# Patient Record
Sex: MALE | Race: WHITE
[De-identification: names, ages, dates, MRNs, and addresses within clinical notes are randomized per-mention and may not be internally consistent; named-entity substitution may affect disease eponyms.]

---

## 2017-08-18 ENCOUNTER — HOSPITAL ENCOUNTER (OUTPATIENT)
Dept: HOSPITAL 22 - LAB | Age: 67
End: 2017-08-18
Attending: INTERNAL MEDICINE
Payer: MEDICARE

## 2017-08-18 DIAGNOSIS — Q60.0: Primary | ICD-10-CM

## 2017-08-18 DIAGNOSIS — I48.0: ICD-10-CM

## 2017-08-18 DIAGNOSIS — I10: ICD-10-CM

## 2017-08-18 DIAGNOSIS — Z95.0: ICD-10-CM

## 2017-08-18 LAB
BUN: 20 MG/DL (ref 7–18)
GFR SERPLBLD BASED ON 1.73 SQ M-ARVRAT: 61 ML/MIN (ref 60–?)

## 2021-04-15 ENCOUNTER — HOSPITAL ENCOUNTER (OUTPATIENT)
Age: 71
End: 2021-04-15
Payer: MEDICARE

## 2021-04-15 DIAGNOSIS — I11.9: ICD-10-CM

## 2021-04-15 DIAGNOSIS — I48.0: ICD-10-CM

## 2021-04-15 DIAGNOSIS — R94.31: ICD-10-CM

## 2021-04-15 DIAGNOSIS — I27.20: ICD-10-CM

## 2021-04-15 DIAGNOSIS — Z95.0: ICD-10-CM

## 2021-04-15 PROCEDURE — 93306 TTE W/DOPPLER COMPLETE: CPT

## 2021-04-19 ENCOUNTER — HOSPITAL ENCOUNTER (OUTPATIENT)
Age: 71
End: 2021-04-19
Payer: MEDICARE

## 2021-04-19 DIAGNOSIS — E11.9: ICD-10-CM

## 2021-04-19 DIAGNOSIS — I11.0: ICD-10-CM

## 2021-04-19 DIAGNOSIS — I50.9: Primary | ICD-10-CM

## 2021-04-19 LAB
ALBUMIN LEVEL: 3.7 G/DL (ref 3.5–5)
ALP ISO SERPL-ACNC: 84 U/L (ref 38–126)
ALT SERPLBLD-CCNC: 26 U/L (ref 12–78)
ANION GAP SERPL CALC-SCNC: 12.9 MEQ/L (ref 5–15)
AST SERPL QL: 38 U/L (ref 17–59)
BILIRUB DIRECT SERPL-MCNC: 0 MG/DL (ref 0–0.4)
BILIRUB INDIRECT SERPL-MCNC: 0.9 MG/DL (ref 0–0.9)
BILIRUB INDIRECT SERPL-MCNC: 0.9 MG/DL (ref 0–1.1)
BILIRUBIN,TOTAL: 0.9 MG/DL (ref 0.2–1.3)
BUN SERPL-MCNC: 20 MG/DL (ref 9–20)
CALCIUM SPEC-MCNC: 8.7 MG/DL (ref 8.4–10.2)
CHLORIDE SPEC-SCNC: 105 MMOL/L (ref 98–107)
CO2 SERPL-SCNC: 24 MMOL/L (ref 22–30)
CREAT BLD-SCNC: 1.4 MG/DL (ref 0.66–1.25)
ESTIMATED GLOMERULAR FILT RATE: 50 ML/MIN (ref 60–?)
GFR (AFRICAN AMERICAN): 61 ML/MIN (ref 60–?)
GLUCOSE: 158 MG/DL (ref 74–100)
HCT VFR BLD CALC: 37.3 % (ref 42–52)
HGB BLD-MCNC: 13.3 G/DL (ref 14.1–18)
MCHC RBC-ENTMCNC: 35.7 G/DL (ref 31.8–35.4)
MCV RBC: 91.9 FL (ref 80–94)
MEAN CORPUSCULAR HEMOGLOBIN: 32.8 PG (ref 27–31.2)
NT PRO BRAIN NATRIURETIC PEP.: 547 PG/ML (ref 0–125)
PLATELET # BLD: 189 K/MM3 (ref 142–424)
POTASSIUM: 3.9 MMOL/L (ref 3.5–5.1)
PROT SERPL-MCNC: 6.2 G/DL (ref 6.3–8.2)
RBC # BLD AUTO: 4.06 M/MM3 (ref 4.6–6.2)
SODIUM SPEC-SCNC: 138 MMOL/L (ref 136–145)
WBC # BLD AUTO: 6.9 K/MM3 (ref 4.8–10.8)

## 2021-04-19 PROCEDURE — 83880 ASSAY OF NATRIURETIC PEPTIDE: CPT

## 2021-04-19 PROCEDURE — 36415 COLL VENOUS BLD VENIPUNCTURE: CPT

## 2021-04-19 PROCEDURE — 85025 COMPLETE CBC W/AUTO DIFF WBC: CPT

## 2021-04-19 PROCEDURE — 80048 BASIC METABOLIC PNL TOTAL CA: CPT

## 2021-04-19 PROCEDURE — 80076 HEPATIC FUNCTION PANEL: CPT

## 2021-05-20 ENCOUNTER — HOSPITAL ENCOUNTER (OUTPATIENT)
Age: 71
End: 2021-05-20
Payer: MEDICARE

## 2021-05-20 DIAGNOSIS — I10: ICD-10-CM

## 2021-05-20 DIAGNOSIS — I48.0: ICD-10-CM

## 2021-05-20 DIAGNOSIS — Z95.0: ICD-10-CM

## 2021-05-20 DIAGNOSIS — R60.9: ICD-10-CM

## 2021-05-20 DIAGNOSIS — R94.31: ICD-10-CM

## 2021-05-20 DIAGNOSIS — I27.20: ICD-10-CM

## 2021-05-20 DIAGNOSIS — R06.00: ICD-10-CM

## 2021-05-20 LAB
ANION GAP SERPL CALC-SCNC: 11.4 MEQ/L (ref 5–15)
BUN SERPL-MCNC: 27 MG/DL (ref 9–20)
CALCIUM SPEC-MCNC: 8.8 MG/DL (ref 8.4–10.2)
CHLORIDE SPEC-SCNC: 104 MMOL/L (ref 98–107)
CO2 SERPL-SCNC: 28 MMOL/L (ref 22–30)
CREAT BLD-SCNC: 1.3 MG/DL (ref 0.66–1.25)
ESTIMATED GLOMERULAR FILT RATE: 55 ML/MIN (ref 60–?)
GFR (AFRICAN AMERICAN): 66 ML/MIN (ref 60–?)
GLUCOSE: 141 MG/DL (ref 74–100)
POTASSIUM: 4.4 MMOL/L (ref 3.5–5.1)
SODIUM SPEC-SCNC: 139 MMOL/L (ref 136–145)

## 2021-05-20 PROCEDURE — 80048 BASIC METABOLIC PNL TOTAL CA: CPT

## 2021-05-20 PROCEDURE — 93976 VASCULAR STUDY: CPT

## 2021-05-20 PROCEDURE — 36415 COLL VENOUS BLD VENIPUNCTURE: CPT

## 2021-05-24 ENCOUNTER — HOSPITAL ENCOUNTER (OUTPATIENT)
Age: 71
End: 2021-05-24
Payer: MEDICARE

## 2021-05-24 DIAGNOSIS — E78.5: ICD-10-CM

## 2021-05-24 DIAGNOSIS — Z95.0: ICD-10-CM

## 2021-05-24 DIAGNOSIS — I10: ICD-10-CM

## 2021-05-24 DIAGNOSIS — R73.01: Primary | ICD-10-CM

## 2021-05-24 LAB
ANION GAP SERPL CALC-SCNC: 14.6 MEQ/L (ref 5–15)
BUN SERPL-MCNC: 45 MG/DL (ref 9–20)
CALCIUM SPEC-MCNC: 9.2 MG/DL (ref 8.4–10.2)
CHLORIDE SPEC-SCNC: 105 MMOL/L (ref 98–107)
CO2 SERPL-SCNC: 23 MMOL/L (ref 22–30)
CREAT BLD-SCNC: 2.1 MG/DL (ref 0.66–1.25)
ESTIMATED GLOMERULAR FILT RATE: 31 ML/MIN (ref 60–?)
GFR (AFRICAN AMERICAN): 38 ML/MIN (ref 60–?)
GLUCOSE: 144 MG/DL (ref 74–100)
HBA1C MFR BLD: 5.7 % (ref 4–6)
POTASSIUM: 4.6 MMOL/L (ref 3.5–5.1)
SODIUM SPEC-SCNC: 138 MMOL/L (ref 136–145)
URATE SERPL-SCNC: 8.8 MG/DL (ref 3.5–8.5)

## 2021-05-24 PROCEDURE — 83036 HEMOGLOBIN GLYCOSYLATED A1C: CPT

## 2021-05-24 PROCEDURE — 80048 BASIC METABOLIC PNL TOTAL CA: CPT

## 2021-05-24 PROCEDURE — 84550 ASSAY OF BLOOD/URIC ACID: CPT

## 2021-06-01 ENCOUNTER — HOSPITAL ENCOUNTER (OUTPATIENT)
Age: 71
End: 2021-06-01
Payer: MEDICARE

## 2021-06-01 DIAGNOSIS — R94.31: ICD-10-CM

## 2021-06-01 DIAGNOSIS — Z90.5: ICD-10-CM

## 2021-06-01 DIAGNOSIS — I27.20: ICD-10-CM

## 2021-06-01 DIAGNOSIS — Z95.0: ICD-10-CM

## 2021-06-01 DIAGNOSIS — R06.00: ICD-10-CM

## 2021-06-01 DIAGNOSIS — I48.0: ICD-10-CM

## 2021-06-01 DIAGNOSIS — I11.9: Primary | ICD-10-CM

## 2021-06-01 LAB
ANION GAP SERPL CALC-SCNC: 10.1 MEQ/L (ref 5–15)
BUN SERPL-MCNC: 33 MG/DL (ref 9–20)
CALCIUM SPEC-MCNC: 8.5 MG/DL (ref 8.4–10.2)
CHLORIDE SPEC-SCNC: 105 MMOL/L (ref 98–107)
CO2 SERPL-SCNC: 26 MMOL/L (ref 22–30)
CREAT BLD-SCNC: 1.6 MG/DL (ref 0.66–1.25)
ESTIMATED GLOMERULAR FILT RATE: 43 ML/MIN (ref 60–?)
GFR (AFRICAN AMERICAN): 52 ML/MIN (ref 60–?)
GLUCOSE: 117 MG/DL (ref 74–100)
POTASSIUM: 5.1 MMOL/L (ref 3.5–5.1)
SODIUM SPEC-SCNC: 136 MMOL/L (ref 136–145)

## 2021-06-01 PROCEDURE — 80048 BASIC METABOLIC PNL TOTAL CA: CPT

## 2021-06-01 PROCEDURE — 36415 COLL VENOUS BLD VENIPUNCTURE: CPT

## 2021-06-14 ENCOUNTER — HOSPITAL ENCOUNTER (OUTPATIENT)
Age: 71
End: 2021-06-14
Payer: MEDICARE

## 2021-06-14 DIAGNOSIS — R06.00: ICD-10-CM

## 2021-06-14 DIAGNOSIS — I27.20: ICD-10-CM

## 2021-06-14 DIAGNOSIS — Z90.5: ICD-10-CM

## 2021-06-14 DIAGNOSIS — R94.31: ICD-10-CM

## 2021-06-14 DIAGNOSIS — I11.9: Primary | ICD-10-CM

## 2021-06-14 DIAGNOSIS — I48.0: ICD-10-CM

## 2021-06-14 DIAGNOSIS — Z95.0: ICD-10-CM

## 2021-06-14 LAB
ANION GAP SERPL CALC-SCNC: 12.2 MEQ/L (ref 5–15)
BUN SERPL-MCNC: 36 MG/DL (ref 9–20)
CALCIUM SPEC-MCNC: 8.8 MG/DL (ref 8.4–10.2)
CHLORIDE SPEC-SCNC: 103 MMOL/L (ref 98–107)
CO2 SERPL-SCNC: 26 MMOL/L (ref 22–30)
CREAT BLD-SCNC: 1.7 MG/DL (ref 0.66–1.25)
ESTIMATED GLOMERULAR FILT RATE: 40 ML/MIN (ref 60–?)
GFR (AFRICAN AMERICAN): 48 ML/MIN (ref 60–?)
GLUCOSE: 130 MG/DL (ref 74–100)
POTASSIUM: 5.2 MMOL/L (ref 3.5–5.1)
SODIUM SPEC-SCNC: 136 MMOL/L (ref 136–145)

## 2021-06-14 PROCEDURE — 36415 COLL VENOUS BLD VENIPUNCTURE: CPT

## 2021-06-14 PROCEDURE — 80048 BASIC METABOLIC PNL TOTAL CA: CPT

## 2021-06-25 ENCOUNTER — HOSPITAL ENCOUNTER (OUTPATIENT)
Age: 71
End: 2021-06-25
Payer: MEDICARE

## 2021-06-25 DIAGNOSIS — I48.0: ICD-10-CM

## 2021-06-25 DIAGNOSIS — N17.9: ICD-10-CM

## 2021-06-25 DIAGNOSIS — E55.9: ICD-10-CM

## 2021-06-25 DIAGNOSIS — R06.00: ICD-10-CM

## 2021-06-25 DIAGNOSIS — Z95.0: ICD-10-CM

## 2021-06-25 DIAGNOSIS — R60.9: ICD-10-CM

## 2021-06-25 DIAGNOSIS — Z90.5: ICD-10-CM

## 2021-06-25 DIAGNOSIS — I27.20: ICD-10-CM

## 2021-06-25 DIAGNOSIS — I11.9: Primary | ICD-10-CM

## 2021-06-25 LAB
25-OH VITAMIN D, TOTAL: 20.5 NG/ML (ref 30–100)
ALBUMIN LEVEL: 4 G/DL (ref 3.5–5)
ANION GAP SERPL CALC-SCNC: 12.2 MEQ/L (ref 5–15)
ANION GAP SERPL CALC-SCNC: 12.3 MEQ/L (ref 5–15)
BUN SERPL-MCNC: 29 MG/DL (ref 9–20)
BUN SERPL-MCNC: 29 MG/DL (ref 9–20)
CALCIUM SPEC-MCNC: 8.7 MG/DL (ref 8.4–10.2)
CALCIUM SPEC-MCNC: 8.7 MG/DL (ref 8.4–10.2)
CHLORIDE SPEC-SCNC: 103 MMOL/L (ref 98–107)
CHLORIDE SPEC-SCNC: 104 MMOL/L (ref 98–107)
CO2 SERPL-SCNC: 26 MMOL/L (ref 22–30)
CO2 SERPL-SCNC: 26 MMOL/L (ref 22–30)
COLOR UR: (no result)
CREAT BLD-SCNC: 1.6 MG/DL (ref 0.66–1.25)
CREAT BLD-SCNC: 1.6 MG/DL (ref 0.66–1.25)
ESTIMATED GLOMERULAR FILT RATE: 43 ML/MIN (ref 60–?)
ESTIMATED GLOMERULAR FILT RATE: 43 ML/MIN (ref 60–?)
GFR (AFRICAN AMERICAN): 52 ML/MIN (ref 60–?)
GFR (AFRICAN AMERICAN): 52 ML/MIN (ref 60–?)
GLUCOSE: 137 MG/DL (ref 74–100)
GLUCOSE: 138 MG/DL (ref 74–100)
HCT VFR BLD CALC: 38.6 % (ref 42–52)
HGB BLD-MCNC: 13.6 G/DL (ref 14.1–18)
MCHC RBC-ENTMCNC: 35.2 G/DL (ref 31.8–35.4)
MCV RBC: 96.1 FL (ref 80–94)
MEAN CORPUSCULAR HEMOGLOBIN: 33.8 PG (ref 27–31.2)
MICRO URNS: (no result)
PH UR: 6 [PH] (ref 5–8.5)
PHOSPHOROUS: 3.4 MG/DL (ref 2.5–4.5)
PLATELET # BLD: 170 K/MM3 (ref 142–424)
POTASSIUM: 5.2 MMOL/L (ref 3.5–5.1)
POTASSIUM: 5.3 MMOL/L (ref 3.5–5.1)
PROT UR STRIP-MCNC: (no result) MG/DL
RBC # BLD AUTO: 4.01 M/MM3 (ref 4.6–6.2)
SODIUM SPEC-SCNC: 136 MMOL/L (ref 136–145)
SODIUM SPEC-SCNC: 137 MMOL/L (ref 136–145)
SP GR UR: >= 1.03 (ref 1–1.03)
UROBILINOGEN UR QL: 0.2 EU/DL
WBC # BLD AUTO: 6.9 K/MM3 (ref 4.8–10.8)

## 2021-06-25 PROCEDURE — 36415 COLL VENOUS BLD VENIPUNCTURE: CPT

## 2021-06-25 PROCEDURE — 80048 BASIC METABOLIC PNL TOTAL CA: CPT

## 2021-06-25 PROCEDURE — 85025 COMPLETE CBC W/AUTO DIFF WBC: CPT

## 2021-06-25 PROCEDURE — 83970 ASSAY OF PARATHORMONE: CPT

## 2021-06-25 PROCEDURE — 82330 ASSAY OF CALCIUM: CPT

## 2021-06-25 PROCEDURE — 80069 RENAL FUNCTION PANEL: CPT

## 2021-06-25 PROCEDURE — 82570 ASSAY OF URINE CREATININE: CPT

## 2021-06-25 PROCEDURE — 84155 ASSAY OF PROTEIN SERUM: CPT

## 2021-06-25 PROCEDURE — 82306 VITAMIN D 25 HYDROXY: CPT

## 2021-06-25 PROCEDURE — 81001 URINALYSIS AUTO W/SCOPE: CPT

## 2021-06-26 LAB — CALCIUM, IONIZED: 5 MG/DL (ref 4.5–5.6)

## 2021-07-06 ENCOUNTER — HOSPITAL ENCOUNTER (OUTPATIENT)
Age: 71
End: 2021-07-06
Payer: MEDICARE

## 2021-07-06 DIAGNOSIS — R00.0: Primary | ICD-10-CM

## 2021-07-06 LAB
T4 (THYROXINE): 8.3 UG/DL (ref 5.53–11)
TSH SERPL-ACNC: 0.77 UIU/ML (ref 0.47–4.68)

## 2021-07-06 PROCEDURE — 84436 ASSAY OF TOTAL THYROXINE: CPT

## 2021-07-06 PROCEDURE — 84443 ASSAY THYROID STIM HORMONE: CPT

## 2021-09-30 ENCOUNTER — HOSPITAL ENCOUNTER (OUTPATIENT)
Age: 71
End: 2021-09-30
Payer: MEDICARE

## 2021-09-30 DIAGNOSIS — R53.83: ICD-10-CM

## 2021-09-30 DIAGNOSIS — R80.1: ICD-10-CM

## 2021-09-30 DIAGNOSIS — N18.32: Primary | ICD-10-CM

## 2021-09-30 LAB
ALBUMIN LEVEL: 4.1 G/DL (ref 3.5–5)
ANION GAP SERPL CALC-SCNC: 15.8 MEQ/L (ref 5–15)
BUN SERPL-MCNC: 29 MG/DL (ref 9–20)
CALCIUM SPEC-MCNC: 9.2 MG/DL (ref 8.4–10.2)
CHLORIDE SPEC-SCNC: 106 MMOL/L (ref 98–107)
CO2 SERPL-SCNC: 21 MMOL/L (ref 22–30)
COLOR UR: YELLOW
CREAT BLD-SCNC: 1.6 MG/DL (ref 0.66–1.25)
ESTIMATED GLOMERULAR FILT RATE: 43 ML/MIN (ref 60–?)
GFR (AFRICAN AMERICAN): 52 ML/MIN (ref 60–?)
GLUCOSE: 143 MG/DL (ref 74–100)
MICRO URNS: (no result)
PH UR: 5.5 [PH] (ref 5–8.5)
PHOSPHOROUS: 3.7 MG/DL (ref 2.5–4.5)
POTASSIUM: 4.8 MMOL/L (ref 3.5–5.1)
PROT UR STRIP-MCNC: (no result) MG/DL
SODIUM SPEC-SCNC: 138 MMOL/L (ref 136–145)
SP GR UR: >= 1.03 (ref 1–1.03)
SQUAMOUS URNS QL MICRO: (no result) #/HPF (ref 0–5)
UROBILINOGEN UR QL: 0.2 EU/DL
WBC # UR: (no result) #/HPF (ref 0–3)

## 2021-09-30 PROCEDURE — 82784 ASSAY IGA/IGD/IGG/IGM EACH: CPT

## 2021-09-30 PROCEDURE — 80074 ACUTE HEPATITIS PANEL: CPT

## 2021-09-30 PROCEDURE — 36415 COLL VENOUS BLD VENIPUNCTURE: CPT

## 2021-09-30 PROCEDURE — 86161 COMPLEMENT/FUNCTION ACTIVITY: CPT

## 2021-09-30 PROCEDURE — 86334 IMMUNOFIX E-PHORESIS SERUM: CPT

## 2021-09-30 PROCEDURE — 86038 ANTINUCLEAR ANTIBODIES: CPT

## 2021-09-30 PROCEDURE — 83883 ASSAY NEPHELOMETRY NOT SPEC: CPT

## 2021-09-30 PROCEDURE — 84165 PROTEIN E-PHORESIS SERUM: CPT

## 2021-09-30 PROCEDURE — 80069 RENAL FUNCTION PANEL: CPT

## 2021-09-30 PROCEDURE — 81001 URINALYSIS AUTO W/SCOPE: CPT

## 2021-09-30 PROCEDURE — 84155 ASSAY OF PROTEIN SERUM: CPT

## 2021-09-30 PROCEDURE — 82570 ASSAY OF URINE CREATININE: CPT

## 2021-10-01 LAB
ALBUMIN SERPL ELPH-MCNC: 3.8 G/DL (ref 2.9–4.4)
ALPHA1 GLOB SERPL ELPH-MCNC: 0.2 G/DL (ref 0–0.4)
ALPHA2 GLOB SERPL ELPH-MCNC: 0.7 G/DL (ref 0.4–1)
GAMMA GLOBULIN: 0.9 G/DL (ref 0.4–1.8)
HCV AB SER IA-ACNC: <0.1 S/CO RATIO (ref 0–0.9)
IGA SERPL-MCNC: 207 MG/DL (ref 61–437)
IGG SERPL-MCNC: 942 MG/DL (ref 603–1613)
IGM SERPL-MCNC: 56 MG/DL (ref 20–172)

## 2021-11-24 ENCOUNTER — OFFICE VISIT (OUTPATIENT)
Dept: CARDIOLOGY | Facility: CLINIC | Age: 71
End: 2021-11-24

## 2021-11-24 VITALS
OXYGEN SATURATION: 97 % | SYSTOLIC BLOOD PRESSURE: 110 MMHG | DIASTOLIC BLOOD PRESSURE: 70 MMHG | BODY MASS INDEX: 35.56 KG/M2 | HEART RATE: 108 BPM | WEIGHT: 292 LBS | HEIGHT: 76 IN

## 2021-11-24 DIAGNOSIS — I44.2 AV BLOCK, COMPLETE (HCC): ICD-10-CM

## 2021-11-24 DIAGNOSIS — I48.19 ATRIAL FIBRILLATION, PERSISTENT (HCC): Primary | ICD-10-CM

## 2021-11-24 DIAGNOSIS — I10 PRIMARY HYPERTENSION: ICD-10-CM

## 2021-11-24 PROCEDURE — 99204 OFFICE O/P NEW MOD 45 MIN: CPT | Performed by: INTERNAL MEDICINE

## 2021-11-24 PROCEDURE — 93000 ELECTROCARDIOGRAM COMPLETE: CPT | Performed by: INTERNAL MEDICINE

## 2021-11-24 PROCEDURE — 93280 PM DEVICE PROGR EVAL DUAL: CPT | Performed by: INTERNAL MEDICINE

## 2021-11-24 RX ORDER — CARVEDILOL 25 MG/1
50 TABLET ORAL 2 TIMES DAILY WITH MEALS
COMMUNITY

## 2021-11-24 RX ORDER — ALLOPURINOL 300 MG/1
300 TABLET ORAL 2 TIMES DAILY
COMMUNITY

## 2021-11-24 RX ORDER — TAMSULOSIN HYDROCHLORIDE 0.4 MG/1
1 CAPSULE ORAL DAILY PRN
COMMUNITY

## 2021-11-24 RX ORDER — IRBESARTAN 300 MG/1
300 TABLET ORAL DAILY
COMMUNITY

## 2021-11-24 RX ORDER — LORATADINE 10 MG/1
10 TABLET ORAL DAILY
COMMUNITY

## 2021-11-24 RX ORDER — SPIRONOLACTONE 25 MG/1
25 TABLET ORAL DAILY
COMMUNITY

## 2021-11-24 RX ORDER — FUROSEMIDE 40 MG/1
40 TABLET ORAL 2 TIMES DAILY
COMMUNITY

## 2021-11-24 RX ORDER — LORAZEPAM 0.5 MG/1
0.5 TABLET ORAL EVERY 8 HOURS PRN
COMMUNITY

## 2021-11-24 RX ORDER — ASPIRIN 81 MG/1
81 TABLET ORAL DAILY
COMMUNITY

## 2021-11-24 RX ORDER — BACLOFEN 10 MG/1
10 TABLET ORAL
COMMUNITY

## 2021-11-24 NOTE — PROGRESS NOTES
Subjective:     Encounter Date:11/24/2021    Primary Care Physician: Anil Jacobo MD      Patient ID: Juan Jose Crisostomo is a 71 y.o. male.    Chief Complaint:Atrial Fibrillation, Hypertension, Shortness of Breath, and Irregular Heart Beat    PROBLEM LIST:  1. Bradycardia  a. Reported normal coronary arteries 2015  b. SJM DC PPM 2015 implanted in [place]  2. Persistent atrial fibrillation  a. Noted 6/2021  b. CHADSVASC- 3  c. Persistent since May seems symptomatic.  3. Hypertension  4. Obesity  5. Asthma  6. BPH  7. Anxiety  8. Gout  9. Arthritis  10. History of nephrolithiasis  11. Surgeries:  a. Left nephrectomy secondary to trauma  b. Left lower lobectomy secondary to trauma  c. Appendectomy  d. Right knee surgery      No Known Allergies      Current Outpatient Medications:   •  allopurinol (ZYLOPRIM) 300 MG tablet, Take 300 mg by mouth 2 (Two) Times a Day., Disp: , Rfl:   •  aspirin 81 MG EC tablet, Take 81 mg by mouth Daily., Disp: , Rfl:   •  baclofen (LIORESAL) 10 MG tablet, Take 10 mg by mouth every night at bedtime., Disp: , Rfl:   •  carvedilol (COREG) 25 MG tablet, Take 50 mg by mouth 2 (Two) Times a Day With Meals., Disp: , Rfl:   •  Cod Liver Oil capsule, Take  by mouth., Disp: , Rfl:   •  furosemide (LASIX) 40 MG tablet, Take 40 mg by mouth 2 (Two) Times a Day., Disp: , Rfl:   •  irbesartan (AVAPRO) 300 MG tablet, Take 300 mg by mouth Daily., Disp: , Rfl:   •  loratadine (CLARITIN) 10 MG tablet, Take 10 mg by mouth Daily., Disp: , Rfl:   •  LORazepam (ATIVAN) 0.5 MG tablet, Take 0.5 mg by mouth Every 8 (Eight) Hours As Needed for Anxiety., Disp: , Rfl:   •  spironolactone (ALDACTONE) 25 MG tablet, Take 25 mg by mouth Daily. Monday,wednesday, fridays, Disp: , Rfl:   •  tamsulosin (FLOMAX) 0.4 MG capsule 24 hr capsule, Take 1 capsule by mouth Daily As Needed., Disp: , Rfl:         History of Present Illness    Patient is a 71-year-old  male who is being seen today for his cardiac care as well as  second opinion regarding atrial fibrillation.  He has previous history of pacemaker implanted and 2015 secondary to reported bradycardia.  Since that time he has been followed by another cardiologist.  Patient notes that in  of this year he was told that he had had persistent atrial fibrillation.  Notes that since May he has had increased shortness of breath.  Denies any chest pain, pressure, tightness.  No reported syncope.  Has intermittent hot flashes.  Is also been having some intermittent episodes of hypertension.  Has some occasional lower extremity edema which he treats with as needed diuretics.  On device check today setting was changed regarding his atrial fibrillation tracking and patient did note changes/improvement in his dyspnea after this.    The following portions of the patient's history were reviewed and updated as appropriate: allergies, current medications, past family history, past medical history, past social history, past surgical history and problem list.    Family History   Problem Relation Age of Onset   • Heart disease Mother    • Cancer Father        Social History     Tobacco Use   • Smoking status: Former Smoker     Quit date:      Years since quittin.   • Smokeless tobacco: Not on file   Substance Use Topics   • Alcohol use: Not Currently   • Drug use: Not on file         Review of Systems   Constitutional: Negative for fever and malaise/fatigue.   HENT: Negative for nosebleeds.    Eyes: Negative for redness and visual disturbance.   Cardiovascular: Positive for chest pain. Negative for orthopnea, palpitations and paroxysmal nocturnal dyspnea.   Respiratory: Positive for shortness of breath. Negative for cough, snoring, sputum production and wheezing.    Endocrine: Positive for heat intolerance.   Hematologic/Lymphatic: Negative for bleeding problem.   Skin: Negative for flushing, itching and rash.   Musculoskeletal: Positive for arthritis. Negative for falls, joint pain  "and muscle cramps.   Gastrointestinal: Positive for change in bowel habit. Negative for abdominal pain, diarrhea, heartburn, nausea and vomiting.   Genitourinary: Positive for dysuria and urgency. Negative for hematuria.   Neurological: Positive for headaches. Negative for excessive daytime sleepiness, dizziness, tremors and weakness.   Psychiatric/Behavioral: Negative for substance abuse. The patient is nervous/anxious.           Objective:   /70   Pulse 108   Ht 193 cm (76\")   Wt 132 kg (292 lb)   SpO2 97%   BMI 35.54 kg/m²         Vitals reviewed.   Constitutional:       Appearance: Healthy appearance. Well-developed and not in distress.   Eyes:      Conjunctiva/sclera: Conjunctivae normal.      Pupils: Pupils are equal, round, and reactive to light.   HENT:      Head: Normocephalic and atraumatic.    Mouth/Throat:      Pharynx: Oropharynx is clear.   Neck:      Thyroid: Thyroid normal. No thyromegaly.      Vascular: Normal carotid pulses. No carotid bruit or JVD. JVD normal.      Lymphadenopathy: No cervical adenopathy.   Pulmonary:      Effort: No respiratory distress.      Breath sounds: No wheezing. No rales.   Chest:      Chest wall: Not tender to palpatation.   Cardiovascular:      Normal rate. Regular rhythm.      No gallop.   Pulses:     Carotid: 2+ bilaterally.     Dorsalis pedis: 2+ bilaterally.     Posterior tibial: 2+ bilaterally.  Abdominal:      General: There is no distension or abdominal bruit.      Palpations: There is no abdominal mass.      Tenderness: There is no abdominal tenderness. There is no rebound.   Musculoskeletal:         General: No tenderness or deformity.      Extremities: No clubbing present.Skin:     General: Skin is warm and dry. There is no cyanosis.      Findings: No rash.   Neurological:      Mental Status: Alert, oriented to person, place, and time and oriented to person, place and time.           ECG 12 Lead    Date/Time: 11/24/2021 10:11 AM  Performed by: " Saeid Pablo MD  Authorized by: Saeid Pablo MD   Comparison: not compared with previous ECG   Previous ECG: no previous ECG available  Rhythm: atrial fibrillation and paced    Clinical impression: abnormal EKG                  Assessment:   Assessment/Plan      Diagnoses and all orders for this visit:    1. Atrial fibrillation, persistent (HCC) (Primary)  -     ECG 12 Lead    2. AV block, complete (HCC)    3. Primary hypertension      1.  Atrial fibrillation, 3 months onset.  Some mild tachypalpitations, but no change in dyspnea on exertion.  On arrival to office inappropriate A. fib tracking.  Was corrected with programming  2.  Complete heart block, status post pacemaker.  3.  Hypertension well-controlled on carvedilol and irbesartan    Recommendations:  1.  Discussed CVA prophylaxis at length with patient and daughter.  He definitely needs this given his high ChadsVasc score of 3.  We have given him samples for Eliquis 5 mg twice daily, and will attempt to get patient assistance for him given the cost issues in the past.  He was not interested in warfarin.  2.  Discontinue aspirin.  3.  Have reprogrammed his device to more appropriately rate track his A. fib.  This should improve his symptoms of tachypalpitations with exertion.  If it does not he will contact our office.  We can reconsider cardioversion at that time if symptomatic.  4.  We will transfer transtelephonic/at home checks to our office  5.  Revisit 6 months with a device check       Selena JONAS scribed portions of this dictation for Dr. Saeid Pablo.    I have seen and examined the patient, I have reviewed the note, discussed the case with the advance practice clinician, made necessary changes and I agree with the final note.    Saeid Pablo MD  11/24/21  11:04 EST      Dictated utilizing Dragon dictation

## 2021-12-15 ENCOUNTER — TELEPHONE (OUTPATIENT)
Dept: CARDIOLOGY | Facility: CLINIC | Age: 71
End: 2021-12-15

## 2021-12-15 NOTE — TELEPHONE ENCOUNTER
Tried to call [employer] Kranthi Regarding him receiving his home monitor in the mail. If he has received it would he send in a manual reading. There was no answer and no way to leave a message.

## 2022-02-28 ENCOUNTER — HOSPITAL ENCOUNTER (OUTPATIENT)
Age: 72
End: 2022-02-28
Payer: MEDICARE

## 2022-02-28 DIAGNOSIS — E78.5: ICD-10-CM

## 2022-02-28 DIAGNOSIS — I11.0: Primary | ICD-10-CM

## 2022-02-28 DIAGNOSIS — M10.9: ICD-10-CM

## 2022-02-28 DIAGNOSIS — I50.32: ICD-10-CM

## 2022-02-28 DIAGNOSIS — Z12.5: ICD-10-CM

## 2022-02-28 LAB
ALBUMIN LEVEL: 4.3 G/DL (ref 3.5–5)
ALBUMIN/GLOB SERPL: 1.6 {RATIO} (ref 1.1–1.8)
ALP ISO SERPL-ACNC: 92 U/L (ref 38–126)
ALT SERPLBLD-CCNC: 30 U/L (ref 12–78)
ANION GAP SERPL CALC-SCNC: 14 MEQ/L (ref 5–15)
AST SERPL QL: 51 U/L (ref 17–59)
BILIRUBIN,TOTAL: 0.8 MG/DL (ref 0.2–1.3)
BUN SERPL-MCNC: 31 MG/DL (ref 9–20)
CALCIUM SPEC-MCNC: 8.8 MG/DL (ref 8.4–10.2)
CHLORIDE SPEC-SCNC: 102 MMOL/L (ref 98–107)
CHOLEST SPEC-SCNC: 172 MG/DL (ref 140–200)
CO2 SERPL-SCNC: 27 MMOL/L (ref 22–30)
CREAT BLD-SCNC: 1.4 MG/DL (ref 0.66–1.25)
ESTIMATED GLOMERULAR FILT RATE: 50 ML/MIN (ref 60–?)
GFR (AFRICAN AMERICAN): 61 ML/MIN (ref 60–?)
GLOBULIN SER CALC-MCNC: 2.7 G/DL (ref 1.3–3.2)
GLUCOSE: 100 MG/DL (ref 74–100)
HDLC SERPL-MCNC: 37 MG/DL (ref 40–60)
POTASSIUM: 6 MMOL/L (ref 3.5–5.1)
PROT SERPL-MCNC: 7 G/DL (ref 6.3–8.2)
SODIUM SPEC-SCNC: 137 MMOL/L (ref 136–145)
TRIGLYCERIDES: 208 MG/DL (ref 30–150)
URATE SERPL-SCNC: 7 MG/DL (ref 3.5–8.5)

## 2022-02-28 PROCEDURE — 80061 LIPID PANEL: CPT

## 2022-02-28 PROCEDURE — 84550 ASSAY OF BLOOD/URIC ACID: CPT

## 2022-02-28 PROCEDURE — G0103 PSA SCREENING: HCPCS

## 2022-02-28 PROCEDURE — 80053 COMPREHEN METABOLIC PANEL: CPT

## 2022-03-16 ENCOUNTER — TELEPHONE (OUTPATIENT)
Dept: CARDIOLOGY | Facility: CLINIC | Age: 72
End: 2022-03-16

## 2022-03-16 NOTE — TELEPHONE ENCOUNTER
I tried to call Juan Jose Kranthi to see if he has received his home monitor yet, and assist him in setting it up to send in a remote transmission.  There was no answer at either number listed in his chart, and no option to leave a message.

## 2022-03-17 NOTE — TELEPHONE ENCOUNTER
Mr. Crisostomo's wife returned my call at 1635 3/16/22, and stated that they have not set up his bedside monitor yet.  I told her I could walk her through th steps and help her set it up but she insisted that she wait for her grandson who should be over in a few days. I told her to call us back if they needed further assistance in setting it up.

## 2022-03-18 ENCOUNTER — HOSPITAL ENCOUNTER (OUTPATIENT)
Age: 72
End: 2022-03-18
Payer: MEDICARE

## 2022-03-18 DIAGNOSIS — E87.5: Primary | ICD-10-CM

## 2022-03-18 LAB — POTASSIUM: 4.7 MMOL/L (ref 3.5–5.1)

## 2022-03-18 PROCEDURE — 84132 ASSAY OF SERUM POTASSIUM: CPT

## 2022-04-07 ENCOUNTER — HOSPITAL ENCOUNTER (OUTPATIENT)
Age: 72
End: 2022-04-07
Payer: MEDICARE

## 2022-04-07 DIAGNOSIS — N18.32: Primary | ICD-10-CM

## 2022-04-07 LAB
ALBUMIN LEVEL: 4.3 G/DL (ref 3.5–5)
ANION GAP SERPL CALC-SCNC: 15 MEQ/L (ref 5–15)
BUN SERPL-MCNC: 39 MG/DL (ref 9–20)
CALCIUM SPEC-MCNC: 8.9 MG/DL (ref 8.4–10.2)
CHLORIDE SPEC-SCNC: 103 MMOL/L (ref 98–107)
CO2 SERPL-SCNC: 22 MMOL/L (ref 22–30)
CREAT BLD-SCNC: 1.7 MG/DL (ref 0.66–1.25)
ESTIMATED GLOMERULAR FILT RATE: 40 ML/MIN (ref 60–?)
GFR (AFRICAN AMERICAN): 48 ML/MIN (ref 60–?)
GLUCOSE: 127 MG/DL (ref 74–100)
HCT VFR BLD CALC: 40.3 % (ref 42–52)
HGB BLD-MCNC: 13.8 G/DL (ref 14.1–18)
MCHC RBC-ENTMCNC: 34.1 G/DL (ref 31.8–35.4)
MCV RBC: 101.6 FL (ref 80–94)
MEAN CORPUSCULAR HEMOGLOBIN: 34.7 PG (ref 27–31.2)
PHOSPHOROUS: 4.4 MG/DL (ref 2.5–4.5)
PLATELET # BLD: 196 K/MM3 (ref 142–424)
POTASSIUM: 5 MMOL/L (ref 3.5–5.1)
RBC # BLD AUTO: 3.97 M/MM3 (ref 4.6–6.2)
SODIUM SPEC-SCNC: 135 MMOL/L (ref 136–145)
WBC # BLD AUTO: 8.9 K/MM3 (ref 4.8–10.8)

## 2022-04-07 PROCEDURE — 36415 COLL VENOUS BLD VENIPUNCTURE: CPT

## 2022-04-07 PROCEDURE — 82043 UR ALBUMIN QUANTITATIVE: CPT

## 2022-04-07 PROCEDURE — 82570 ASSAY OF URINE CREATININE: CPT

## 2022-04-07 PROCEDURE — 80069 RENAL FUNCTION PANEL: CPT

## 2022-04-07 PROCEDURE — 85025 COMPLETE CBC W/AUTO DIFF WBC: CPT

## 2022-08-29 ENCOUNTER — HOSPITAL ENCOUNTER (OUTPATIENT)
Age: 72
End: 2022-08-29
Payer: MEDICARE

## 2022-08-29 DIAGNOSIS — Z79.899: ICD-10-CM

## 2022-08-29 DIAGNOSIS — E78.5: ICD-10-CM

## 2022-08-29 DIAGNOSIS — M10.9: ICD-10-CM

## 2022-08-29 DIAGNOSIS — Z95.0: ICD-10-CM

## 2022-08-29 DIAGNOSIS — N40.1: ICD-10-CM

## 2022-08-29 DIAGNOSIS — I11.0: ICD-10-CM

## 2022-08-29 DIAGNOSIS — I50.32: Primary | ICD-10-CM

## 2022-08-29 DIAGNOSIS — E87.5: ICD-10-CM

## 2022-08-29 LAB
ALBUMIN LEVEL: 4 G/DL (ref 3.5–5)
ALBUMIN/GLOB SERPL: 1.5 {RATIO} (ref 1.1–1.8)
ALP ISO SERPL-ACNC: 89 U/L (ref 38–126)
ALT SERPLBLD-CCNC: 31 U/L (ref 12–78)
ANION GAP SERPL CALC-SCNC: 12.7 MEQ/L (ref 5–15)
AST SERPL QL: 46 U/L (ref 17–59)
BILIRUBIN,TOTAL: 0.5 MG/DL (ref 0.2–1.3)
BUN SERPL-MCNC: 30 MG/DL (ref 9–20)
CALCIUM SPEC-MCNC: 8.9 MG/DL (ref 8.4–10.2)
CHLORIDE SPEC-SCNC: 105 MMOL/L (ref 98–107)
CHOLEST SPEC-SCNC: 187 MG/DL (ref 140–200)
CO2 SERPL-SCNC: 25 MMOL/L (ref 22–30)
CREAT BLD-SCNC: 1.7 MG/DL (ref 0.66–1.25)
ESTIMATED GLOMERULAR FILT RATE: 40 ML/MIN (ref 60–?)
GFR (AFRICAN AMERICAN): 48 ML/MIN (ref 60–?)
GLOBULIN SER CALC-MCNC: 2.7 G/DL (ref 1.3–3.2)
GLUCOSE: 110 MG/DL (ref 74–100)
HBA1C MFR BLD: 5.2 % (ref 4–6)
HCT VFR BLD CALC: 40.2 % (ref 42–52)
HDLC SERPL-MCNC: 41 MG/DL (ref 40–60)
HGB BLD-MCNC: 13 G/DL (ref 14.1–18)
MCHC RBC-ENTMCNC: 32.5 G/DL (ref 31.8–35.4)
MCV RBC: 104.1 FL (ref 80–94)
MEAN CORPUSCULAR HEMOGLOBIN: 33.8 PG (ref 27–31.2)
PLATELET # BLD: 187 K/MM3 (ref 142–424)
POTASSIUM: 4.7 MMOL/L (ref 3.5–5.1)
PROT SERPL-MCNC: 6.7 G/DL (ref 6.3–8.2)
RBC # BLD AUTO: 3.86 M/MM3 (ref 4.6–6.2)
SODIUM SPEC-SCNC: 138 MMOL/L (ref 136–145)
TRIGLYCERIDES: 173 MG/DL (ref 30–150)
URATE SERPL-SCNC: 7 MG/DL (ref 3.5–8.5)
WBC # BLD AUTO: 6.5 K/MM3 (ref 4.8–10.8)

## 2022-08-29 PROCEDURE — 80061 LIPID PANEL: CPT

## 2022-08-29 PROCEDURE — 85025 COMPLETE CBC W/AUTO DIFF WBC: CPT

## 2022-08-29 PROCEDURE — 83036 HEMOGLOBIN GLYCOSYLATED A1C: CPT

## 2022-08-29 PROCEDURE — 84550 ASSAY OF BLOOD/URIC ACID: CPT

## 2022-08-29 PROCEDURE — 80053 COMPREHEN METABOLIC PANEL: CPT

## 2022-09-22 ENCOUNTER — HOSPITAL ENCOUNTER (OUTPATIENT)
Age: 72
End: 2022-09-22
Payer: MEDICARE

## 2022-09-22 DIAGNOSIS — I27.20: ICD-10-CM

## 2022-09-22 DIAGNOSIS — I11.9: Primary | ICD-10-CM

## 2022-09-22 DIAGNOSIS — Z90.5: ICD-10-CM

## 2022-09-22 DIAGNOSIS — R06.00: ICD-10-CM

## 2022-09-22 DIAGNOSIS — Z95.0: ICD-10-CM

## 2022-09-22 DIAGNOSIS — I48.91: ICD-10-CM

## 2022-09-22 PROCEDURE — 93306 TTE W/DOPPLER COMPLETE: CPT

## 2022-10-31 ENCOUNTER — HOSPITAL ENCOUNTER (OUTPATIENT)
Age: 72
End: 2022-10-31
Payer: MEDICARE

## 2022-10-31 DIAGNOSIS — N18.2: Primary | ICD-10-CM

## 2022-10-31 DIAGNOSIS — E66.9: ICD-10-CM

## 2022-10-31 LAB
25-OH VITAMIN D, TOTAL: 35.6 NG/ML (ref 30–100)
ALBUMIN LEVEL: 4.1 G/DL (ref 3.5–5)
ANION GAP SERPL CALC-SCNC: 16.7 MEQ/L (ref 5–15)
BUN SERPL-MCNC: 34 MG/DL (ref 9–20)
CALCIUM SPEC-MCNC: 8.6 MG/DL (ref 8.4–10.2)
CHLORIDE SPEC-SCNC: 98 MMOL/L (ref 98–107)
CO2 SERPL-SCNC: 29 MMOL/L (ref 22–30)
COLOR UR: YELLOW
CREAT BLD-SCNC: 1.5 MG/DL (ref 0.66–1.25)
ESTIMATED GLOMERULAR FILT RATE: 46 ML/MIN (ref 60–?)
GFR (AFRICAN AMERICAN): 56 ML/MIN (ref 60–?)
GLUCOSE: 110 MG/DL (ref 74–100)
HCT VFR BLD CALC: 41.4 % (ref 42–52)
HGB BLD-MCNC: 13.8 G/DL (ref 14.1–18)
MCHC RBC-ENTMCNC: 33.4 G/DL (ref 31.8–35.4)
MCV RBC: 98.4 FL (ref 80–94)
MEAN CORPUSCULAR HEMOGLOBIN: 32.9 PG (ref 27–31.2)
MICRO URNS: (no result)
PH UR: 6 [PH] (ref 5–8.5)
PHOSPHOROUS: 3.6 MG/DL (ref 2.5–4.5)
PLATELET # BLD: 227 K/MM3 (ref 142–424)
POTASSIUM: 4.7 MMOL/L (ref 3.5–5.1)
PROT UR STRIP-MCNC: (no result) MG/DL
RBC # BLD AUTO: 4.21 M/MM3 (ref 4.6–6.2)
SODIUM SPEC-SCNC: 139 MMOL/L (ref 136–145)
SP GR UR: 1.01 (ref 1–1.03)
UROBILINOGEN UR QL: 0.2 EU/DL
WBC # BLD AUTO: 7 K/MM3 (ref 4.8–10.8)

## 2022-10-31 PROCEDURE — 85014 HEMATOCRIT: CPT

## 2022-10-31 PROCEDURE — 82570 ASSAY OF URINE CREATININE: CPT

## 2022-10-31 PROCEDURE — 36415 COLL VENOUS BLD VENIPUNCTURE: CPT

## 2022-10-31 PROCEDURE — 80069 RENAL FUNCTION PANEL: CPT

## 2022-10-31 PROCEDURE — 84155 ASSAY OF PROTEIN SERUM: CPT

## 2022-10-31 PROCEDURE — 82306 VITAMIN D 25 HYDROXY: CPT

## 2022-10-31 PROCEDURE — 85018 HEMOGLOBIN: CPT

## 2022-10-31 PROCEDURE — 83970 ASSAY OF PARATHORMONE: CPT

## 2022-10-31 PROCEDURE — 81001 URINALYSIS AUTO W/SCOPE: CPT

## 2022-10-31 PROCEDURE — 85049 AUTOMATED PLATELET COUNT: CPT

## 2022-10-31 PROCEDURE — 85048 AUTOMATED LEUKOCYTE COUNT: CPT

## 2023-02-27 ENCOUNTER — HOSPITAL ENCOUNTER (OUTPATIENT)
Age: 73
End: 2023-02-27
Payer: MEDICARE

## 2023-02-27 DIAGNOSIS — Z95.0: ICD-10-CM

## 2023-02-27 DIAGNOSIS — Z12.5: ICD-10-CM

## 2023-02-27 DIAGNOSIS — Z79.899: ICD-10-CM

## 2023-02-27 DIAGNOSIS — M10.9: ICD-10-CM

## 2023-02-27 DIAGNOSIS — E78.5: ICD-10-CM

## 2023-02-27 DIAGNOSIS — I10: ICD-10-CM

## 2023-02-27 DIAGNOSIS — I49.5: Primary | ICD-10-CM

## 2023-02-27 LAB
ALBUMIN LEVEL: 4.1 G/DL (ref 3.5–5)
ALBUMIN/GLOB SERPL: 1.5 {RATIO} (ref 1.1–1.8)
ALP ISO SERPL-ACNC: 84 U/L (ref 38–126)
ALT SERPLBLD-CCNC: 25 U/L (ref 12–78)
ANION GAP SERPL CALC-SCNC: 9.8 MEQ/L (ref 5–15)
AST SERPL QL: 39 U/L (ref 17–59)
BILIRUBIN,TOTAL: 0.9 MG/DL (ref 0.2–1.3)
BUN SERPL-MCNC: 26 MG/DL (ref 9–20)
CALCIUM SPEC-MCNC: 8.6 MG/DL (ref 8.4–10.2)
CHLORIDE SPEC-SCNC: 101 MMOL/L (ref 98–107)
CHOLEST SPEC-SCNC: 177 MG/DL (ref 140–200)
CO2 SERPL-SCNC: 30 MMOL/L (ref 22–30)
CREAT BLD-SCNC: 1.6 MG/DL (ref 0.66–1.25)
ESTIMATED GLOMERULAR FILT RATE: 43 ML/MIN (ref 60–?)
GFR (AFRICAN AMERICAN): 52 ML/MIN (ref 60–?)
GLOBULIN SER CALC-MCNC: 2.8 G/DL (ref 1.3–3.2)
GLUCOSE: 108 MG/DL (ref 74–100)
HBA1C MFR BLD: 5.6 % (ref 4–6)
HDLC SERPL-MCNC: 38 MG/DL (ref 40–60)
POTASSIUM: 4.8 MMOL/L (ref 3.5–5.1)
PROT SERPL-MCNC: 6.9 G/DL (ref 6.3–8.2)
SODIUM SPEC-SCNC: 136 MMOL/L (ref 136–145)
TRIGLYCERIDES: 282 MG/DL (ref 30–150)
URATE SERPL-SCNC: 7.1 MG/DL (ref 3.5–8.5)

## 2023-02-27 PROCEDURE — 80053 COMPREHEN METABOLIC PANEL: CPT

## 2023-02-27 PROCEDURE — 80061 LIPID PANEL: CPT

## 2023-02-27 PROCEDURE — 84550 ASSAY OF BLOOD/URIC ACID: CPT

## 2023-02-27 PROCEDURE — G0103 PSA SCREENING: HCPCS

## 2023-02-27 PROCEDURE — 83036 HEMOGLOBIN GLYCOSYLATED A1C: CPT

## 2023-03-04 ENCOUNTER — HOSPITAL ENCOUNTER (EMERGENCY)
Age: 73
Discharge: LEFT BEFORE DISCHARGE | End: 2023-03-04
Payer: MEDICARE

## 2023-03-04 VITALS
DIASTOLIC BLOOD PRESSURE: 93 MMHG | RESPIRATION RATE: 16 BRPM | HEART RATE: 64 BPM | SYSTOLIC BLOOD PRESSURE: 173 MMHG | OXYGEN SATURATION: 97 %

## 2023-03-04 VITALS
SYSTOLIC BLOOD PRESSURE: 145 MMHG | DIASTOLIC BLOOD PRESSURE: 95 MMHG | TEMPERATURE: 98.1 F | RESPIRATION RATE: 18 BRPM | OXYGEN SATURATION: 97 % | HEART RATE: 68 BPM

## 2023-03-04 VITALS
DIASTOLIC BLOOD PRESSURE: 93 MMHG | TEMPERATURE: 98.06 F | HEART RATE: 67 BPM | SYSTOLIC BLOOD PRESSURE: 161 MMHG | OXYGEN SATURATION: 97 % | RESPIRATION RATE: 18 BRPM

## 2023-03-04 VITALS — BODY MASS INDEX: 36.5 KG/M2

## 2023-03-04 DIAGNOSIS — Z79.82: ICD-10-CM

## 2023-03-04 DIAGNOSIS — Z90.5: ICD-10-CM

## 2023-03-04 DIAGNOSIS — S60.212A: Primary | ICD-10-CM
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
## 2023-03-04 DIAGNOSIS — Z87.09: ICD-10-CM

## 2023-03-04 DIAGNOSIS — Z87.891: ICD-10-CM

## 2023-03-04 DIAGNOSIS — I48.91: ICD-10-CM

## 2023-03-04 PROCEDURE — 99283 EMERGENCY DEPT VISIT LOW MDM: CPT

## 2023-07-10 ENCOUNTER — HOSPITAL ENCOUNTER (OUTPATIENT)
Age: 73
End: 2023-07-10
Payer: MEDICARE

## 2023-07-10 DIAGNOSIS — N18.32: Primary | ICD-10-CM

## 2023-07-10 LAB
ALBUMIN LEVEL: 4 G/DL (ref 3.5–5)
ANION GAP SERPL CALC-SCNC: 13.5 MEQ/L (ref 5–15)
BUN SERPL-MCNC: 26 MG/DL (ref 9–20)
CALCIUM SPEC-MCNC: 9 MG/DL (ref 8.4–10.2)
CHLORIDE SPEC-SCNC: 102 MMOL/L (ref 98–107)
CO2 SERPL-SCNC: 27 MMOL/L (ref 22–30)
COLOR UR: YELLOW
CREAT BLD-SCNC: 1.4 MG/DL (ref 0.66–1.25)
ESTIMATED GLOMERULAR FILT RATE: 50 ML/MIN (ref 60–?)
GFR (AFRICAN AMERICAN): 60 ML/MIN (ref 60–?)
GLUCOSE: 107 MG/DL (ref 74–100)
HCT VFR BLD CALC: 37.2 % (ref 42–52)
HGB BLD-MCNC: 12.5 G/DL (ref 14.1–18)
MCHC RBC-ENTMCNC: 33.5 G/DL (ref 31.8–35.4)
MCV RBC: 97.9 FL (ref 80–94)
MEAN CORPUSCULAR HEMOGLOBIN: 32.8 PG (ref 27–31.2)
MICRO URNS: (no result)
PH UR: 6.5 [PH] (ref 5–8.5)
PHOSPHOROUS: 4 MG/DL (ref 2.5–4.5)
PLATELET # BLD: 196 K/MM3 (ref 142–424)
POTASSIUM: 5.5 MMOL/L (ref 3.5–5.1)
PROT UR STRIP-MCNC: (no result) MG/DL
RBC # BLD AUTO: 3.8 M/MM3 (ref 4.6–6.2)
SODIUM SPEC-SCNC: 137 MMOL/L (ref 136–145)
SP GR UR: 1.02 (ref 1–1.03)
UROBILINOGEN UR QL: 0.2 EU/DL
WBC # BLD AUTO: 9 K/MM3 (ref 4.8–10.8)

## 2023-07-10 PROCEDURE — 84155 ASSAY OF PROTEIN SERUM: CPT

## 2023-07-10 PROCEDURE — 83970 ASSAY OF PARATHORMONE: CPT

## 2023-07-10 PROCEDURE — 80069 RENAL FUNCTION PANEL: CPT

## 2023-07-10 PROCEDURE — 82570 ASSAY OF URINE CREATININE: CPT

## 2023-07-10 PROCEDURE — 81001 URINALYSIS AUTO W/SCOPE: CPT

## 2023-07-10 PROCEDURE — 82652 VIT D 1 25-DIHYDROXY: CPT

## 2023-07-10 PROCEDURE — 36415 COLL VENOUS BLD VENIPUNCTURE: CPT

## 2023-07-10 PROCEDURE — 82306 VITAMIN D 25 HYDROXY: CPT

## 2023-07-10 PROCEDURE — 85025 COMPLETE CBC W/AUTO DIFF WBC: CPT

## 2023-07-11 LAB — 25-OH VITAMIN D, TOTAL: 21.6 NG/ML (ref 30–100)

## 2023-08-28 ENCOUNTER — HOSPITAL ENCOUNTER (OUTPATIENT)
Age: 73
End: 2023-08-28
Payer: MEDICARE

## 2023-08-28 DIAGNOSIS — M10.9: ICD-10-CM

## 2023-08-28 DIAGNOSIS — I10: Primary | ICD-10-CM

## 2023-08-28 DIAGNOSIS — I49.5: ICD-10-CM

## 2023-08-28 DIAGNOSIS — Z79.899: ICD-10-CM

## 2023-08-28 DIAGNOSIS — E78.5: ICD-10-CM

## 2023-08-28 DIAGNOSIS — N19: ICD-10-CM

## 2023-08-28 DIAGNOSIS — Z95.0: ICD-10-CM

## 2023-08-28 LAB
ALBUMIN LEVEL: 4.1 G/DL (ref 3.5–5)
ALBUMIN/GLOB SERPL: 1.5 {RATIO} (ref 1.1–1.8)
ALP ISO SERPL-ACNC: 94 U/L (ref 38–126)
ALT SERPLBLD-CCNC: 26 U/L (ref 12–78)
ANION GAP SERPL CALC-SCNC: 17.5 MEQ/L (ref 5–15)
AST SERPL QL: 32 U/L (ref 17–59)
BILIRUBIN,TOTAL: 0.6 MG/DL (ref 0.2–1.3)
BUN SERPL-MCNC: 41 MG/DL (ref 9–20)
CALCIUM SPEC-MCNC: 9.5 MG/DL (ref 8.4–10.2)
CHLORIDE SPEC-SCNC: 104 MMOL/L (ref 98–107)
CHOLEST SPEC-SCNC: 182 MG/DL (ref 140–200)
CO2 SERPL-SCNC: 23 MMOL/L (ref 22–30)
CREAT BLD-SCNC: 1.6 MG/DL (ref 0.66–1.25)
ESTIMATED GLOMERULAR FILT RATE: 43 ML/MIN (ref 60–?)
GFR (AFRICAN AMERICAN): 52 ML/MIN (ref 60–?)
GLOBULIN SER CALC-MCNC: 2.7 G/DL (ref 1.3–3.2)
GLUCOSE: 99 MG/DL (ref 74–100)
HBA1C MFR BLD: 6.1 % (ref 4–6)
HCT VFR BLD CALC: 39.7 % (ref 42–52)
HDLC SERPL-MCNC: 35 MG/DL (ref 40–60)
HGB BLD-MCNC: 13 G/DL (ref 14.1–18)
MCHC RBC-ENTMCNC: 32.7 G/DL (ref 31.8–35.4)
MCV RBC: 102.5 FL (ref 80–94)
MEAN CORPUSCULAR HEMOGLOBIN: 33.6 PG (ref 27–31.2)
PLATELET # BLD: 209 K/MM3 (ref 142–424)
POTASSIUM: 5.5 MMOL/L (ref 3.5–5.1)
PROT SERPL-MCNC: 6.8 G/DL (ref 6.3–8.2)
RBC # BLD AUTO: 3.87 M/MM3 (ref 4.6–6.2)
SODIUM SPEC-SCNC: 139 MMOL/L (ref 136–145)
TRIGLYCERIDES: 314 MG/DL (ref 30–150)
URATE SERPL-SCNC: 6.7 MG/DL (ref 3.5–8.5)
WBC # BLD AUTO: 9.5 K/MM3 (ref 4.8–10.8)

## 2023-08-28 PROCEDURE — 83036 HEMOGLOBIN GLYCOSYLATED A1C: CPT

## 2023-08-28 PROCEDURE — 80053 COMPREHEN METABOLIC PANEL: CPT

## 2023-08-28 PROCEDURE — 85025 COMPLETE CBC W/AUTO DIFF WBC: CPT

## 2023-08-28 PROCEDURE — 80061 LIPID PANEL: CPT

## 2023-08-28 PROCEDURE — 84550 ASSAY OF BLOOD/URIC ACID: CPT

## 2023-09-25 ENCOUNTER — HOSPITAL ENCOUNTER (OUTPATIENT)
Age: 73
End: 2023-09-25
Payer: MEDICARE

## 2023-09-25 DIAGNOSIS — S91.301A: Primary | ICD-10-CM

## 2023-09-25 DIAGNOSIS — B95.7: ICD-10-CM

## 2023-09-25 PROCEDURE — 87205 SMEAR GRAM STAIN: CPT

## 2023-09-25 PROCEDURE — 87070 CULTURE OTHR SPECIMN AEROBIC: CPT

## 2023-09-25 PROCEDURE — 87077 CULTURE AEROBIC IDENTIFY: CPT

## 2023-09-25 PROCEDURE — 87186 SC STD MICRODIL/AGAR DIL: CPT

## 2024-02-28 ENCOUNTER — HOSPITAL ENCOUNTER (OUTPATIENT)
Dept: HOSPITAL 22 - LAB.DROPOF | Age: 74
End: 2024-02-28
Payer: MEDICARE

## 2024-02-28 DIAGNOSIS — Z95.0: ICD-10-CM

## 2024-02-28 DIAGNOSIS — I10: ICD-10-CM

## 2024-02-28 DIAGNOSIS — M10.9: ICD-10-CM

## 2024-02-28 DIAGNOSIS — Z12.5: ICD-10-CM

## 2024-02-28 DIAGNOSIS — I50.32: ICD-10-CM

## 2024-02-28 DIAGNOSIS — I48.0: Primary | ICD-10-CM

## 2024-02-28 DIAGNOSIS — R73.01: ICD-10-CM

## 2024-02-28 DIAGNOSIS — J45.909: ICD-10-CM

## 2024-02-28 DIAGNOSIS — E78.5: ICD-10-CM

## 2024-02-28 LAB
ALBUMIN LEVEL: 4.2 G/DL (ref 3.5–5)
ALBUMIN/GLOB SERPL: 1.7 {RATIO} (ref 1.1–1.8)
ALP ISO SERPL-ACNC: 87 U/L (ref 38–126)
ALT SERPLBLD-CCNC: 26 U/L (ref 12–78)
ANION GAP SERPL CALC-SCNC: 13.5 MEQ/L (ref 5–15)
AST SERPL QL: 41 U/L (ref 17–59)
BILIRUBIN,TOTAL: 0.8 MG/DL (ref 0.2–1.3)
BUN SERPL-MCNC: 42 MG/DL (ref 9–20)
CALCIUM SPEC-MCNC: 8.9 MG/DL (ref 8.4–10.2)
CHLORIDE SPEC-SCNC: 106 MMOL/L (ref 98–107)
CHOLEST SPEC-SCNC: 173 MG/DL (ref 140–200)
CO2 SERPL-SCNC: 22 MMOL/L (ref 22–30)
CREATININE,SERUM: 1.9 MG/DL (ref 0.66–1.25)
ESTIMATED GLOMERULAR FILT RATE: 35 ML/MIN (ref 60–?)
GFR (AFRICAN AMERICAN): 42 ML/MIN (ref 60–?)
GLOBULIN SER CALC-MCNC: 2.5 G/DL (ref 1.3–3.2)
GLUCOSE: 119 MG/DL (ref 74–100)
HBA1C MFR BLD: 5.5 % (ref 4–6)
HDLC SERPL-MCNC: 29 MG/DL (ref 40–60)
POTASSIUM: 5.5 MMOL/L (ref 3.5–5.1)
PROT SERPL-MCNC: 6.7 G/DL (ref 6.3–8.2)
SODIUM SPEC-SCNC: 136 MMOL/L (ref 136–145)
TRIGLYCERIDES: 211 MG/DL (ref 30–150)
URATE SERPL-MCNC: 6.6 MG/DL (ref 3.5–8.5)

## 2024-02-28 PROCEDURE — 84550 ASSAY OF BLOOD/URIC ACID: CPT

## 2024-02-28 PROCEDURE — G0103 PSA SCREENING: HCPCS

## 2024-02-28 PROCEDURE — 80053 COMPREHEN METABOLIC PANEL: CPT

## 2024-02-28 PROCEDURE — 80061 LIPID PANEL: CPT

## 2024-02-28 PROCEDURE — 83036 HEMOGLOBIN GLYCOSYLATED A1C: CPT

## 2024-08-28 ENCOUNTER — HOSPITAL ENCOUNTER (OUTPATIENT)
Dept: HOSPITAL 22 - LAB.DROPOF | Age: 74
Discharge: HOME | End: 2024-08-28
Payer: MEDICARE

## 2024-08-28 DIAGNOSIS — R73.02: ICD-10-CM

## 2024-08-28 DIAGNOSIS — E78.5: Primary | ICD-10-CM

## 2024-08-28 DIAGNOSIS — M10.9: ICD-10-CM

## 2024-08-28 DIAGNOSIS — I12.9: ICD-10-CM

## 2024-08-28 DIAGNOSIS — N18.9: ICD-10-CM

## 2024-08-28 LAB
ALBUMIN LEVEL: 4.4 G/DL (ref 3.5–5)
ALBUMIN/GLOB SERPL: 1.6 {RATIO} (ref 1.1–1.8)
ALP ISO SERPL-ACNC: 82 U/L (ref 38–126)
ALT SERPLBLD-CCNC: 26 U/L (ref 12–78)
ANION GAP SERPL CALC-SCNC: 15.5 MEQ/L (ref 5–15)
AST SERPL QL: 36 U/L (ref 17–59)
BILIRUBIN,TOTAL: 0.7 MG/DL (ref 0.2–1.3)
BUN SERPL-MCNC: 44 MG/DL (ref 9–20)
CALCIUM SPEC-MCNC: 8.6 MG/DL (ref 8.4–10.2)
CHLORIDE SPEC-SCNC: 102 MMOL/L (ref 98–107)
CHOLEST SPEC-SCNC: 187 MG/DL (ref 140–200)
CO2 SERPL-SCNC: 24 MMOL/L (ref 22–30)
CREAT BLD-SCNC: 1.7 MG/DL (ref 0.66–1.25)
ESTIMATED GLOMERULAR FILT RATE: 40 ML/MIN (ref 60–?)
GFR (AFRICAN AMERICAN): 48 ML/MIN (ref 60–?)
GLOBULIN SER CALC-MCNC: 2.7 G/DL (ref 1.3–3.2)
GLUCOSE: 105 MG/DL (ref 74–100)
HBA1C MFR BLD: 5.9 % (ref 4–6)
HCT VFR BLD CALC: 41 % (ref 42–52)
HDLC SERPL-MCNC: 41 MG/DL (ref 40–60)
HGB BLD-MCNC: 13.4 G/DL (ref 14.1–18)
MCHC RBC-ENTMCNC: 32.7 G/DL (ref 31.8–35.4)
MCV RBC: 103.7 FL (ref 80–94)
MEAN CORPUSCULAR HEMOGLOBIN: 33.9 PG (ref 27–31.2)
PLATELET # BLD: 185 K/MM3 (ref 142–424)
POTASSIUM: 5.5 MMOL/L (ref 3.5–5.1)
PROT SERPL-MCNC: 7.1 G/DL (ref 6.3–8.2)
RBC # BLD AUTO: 3.95 M/MM3 (ref 4.6–6.2)
SODIUM SPEC-SCNC: 136 MMOL/L (ref 136–145)
TRIGLYCERIDES: 260 MG/DL (ref 30–150)
URATE SERPL-SCNC: 5.4 MG/DL (ref 3.5–8.5)
WBC # BLD AUTO: 7.5 K/MM3 (ref 4.8–10.8)

## 2024-08-28 PROCEDURE — 83036 HEMOGLOBIN GLYCOSYLATED A1C: CPT

## 2024-08-28 PROCEDURE — 80053 COMPREHEN METABOLIC PANEL: CPT

## 2024-08-28 PROCEDURE — 80061 LIPID PANEL: CPT

## 2024-08-28 PROCEDURE — 85025 COMPLETE CBC W/AUTO DIFF WBC: CPT

## 2024-08-28 PROCEDURE — 84550 ASSAY OF BLOOD/URIC ACID: CPT
